# Patient Record
Sex: FEMALE | Race: WHITE | URBAN - METROPOLITAN AREA
[De-identification: names, ages, dates, MRNs, and addresses within clinical notes are randomized per-mention and may not be internally consistent; named-entity substitution may affect disease eponyms.]

---

## 2023-07-18 ENCOUNTER — OFFICE VISIT (OUTPATIENT)
Dept: URBAN - METROPOLITAN AREA CLINIC 41 | Facility: CLINIC | Age: 68
End: 2023-07-18
Payer: COMMERCIAL

## 2023-07-18 DIAGNOSIS — H53.10 SUBJECTIVE VISUAL DISTURBANCE: Primary | ICD-10-CM

## 2023-07-18 DIAGNOSIS — H25.13 AGE-RELATED NUCLEAR CATARACT, BILATERAL: ICD-10-CM

## 2023-07-18 DIAGNOSIS — H35.40 PERIPHERAL RETINAL DEGENERATION: ICD-10-CM

## 2023-07-18 PROCEDURE — 92134 CPTRZ OPH DX IMG PST SGM RTA: CPT | Performed by: OPHTHALMOLOGY

## 2023-07-18 PROCEDURE — 92004 COMPRE OPH EXAM NEW PT 1/>: CPT | Performed by: OPHTHALMOLOGY

## 2023-07-18 ASSESSMENT — INTRAOCULAR PRESSURE
OD: 15
OS: 14

## 2023-07-18 NOTE — IMPRESSION/PLAN
Impression: Peripheral retinal degeneration: H35.40.  Plan: --peripheral pigmentary changes OS
--no evidence of RT/RH with scleral depression
--RDW discussed

## 2023-07-18 NOTE — IMPRESSION/PLAN
Impression: Subjective visual disturbance: H53.10. Plan: --no evidence of retinopathy or optic neuropathy OU
--OCT: normal, healthy macular contour OU, no ERM/traction OU
--pt will continue annual monitoring with Dr. Kayla Teran RTC PRN